# Patient Record
Sex: FEMALE | Race: ASIAN | Employment: UNEMPLOYED | URBAN - METROPOLITAN AREA
[De-identification: names, ages, dates, MRNs, and addresses within clinical notes are randomized per-mention and may not be internally consistent; named-entity substitution may affect disease eponyms.]

---

## 2019-03-12 ENCOUNTER — OFFICE VISIT (OUTPATIENT)
Dept: FAMILY MEDICINE CLINIC | Facility: CLINIC | Age: 34
End: 2019-03-12
Payer: COMMERCIAL

## 2019-03-12 VITALS
HEART RATE: 108 BPM | SYSTOLIC BLOOD PRESSURE: 128 MMHG | DIASTOLIC BLOOD PRESSURE: 76 MMHG | WEIGHT: 200.63 LBS | OXYGEN SATURATION: 98 % | TEMPERATURE: 102 F | RESPIRATION RATE: 16 BRPM | BODY MASS INDEX: 31.12 KG/M2 | HEIGHT: 67.5 IN

## 2019-03-12 DIAGNOSIS — J10.1 INFLUENZA A: Primary | ICD-10-CM

## 2019-03-12 LAB
POCT INFLUENZA A: NEGATIVE
POCT INFLUENZA B: NEGATIVE

## 2019-03-12 PROCEDURE — 87502 INFLUENZA DNA AMP PROBE: CPT | Performed by: PHYSICIAN ASSISTANT

## 2019-03-12 PROCEDURE — 99203 OFFICE O/P NEW LOW 30 MIN: CPT | Performed by: PHYSICIAN ASSISTANT

## 2019-03-12 RX ORDER — OSELTAMIVIR PHOSPHATE 75 MG/1
75 CAPSULE ORAL 2 TIMES DAILY
Qty: 10 CAPSULE | Refills: 0 | Status: SHIPPED | OUTPATIENT
Start: 2019-03-12 | End: 2019-03-17

## 2019-03-12 RX ORDER — DEXTROMETHORPHAN HYDROBROMIDE AND PROMETHAZINE HYDROCHLORIDE 15; 6.25 MG/5ML; MG/5ML
5 SYRUP ORAL 4 TIMES DAILY PRN
Qty: 118 ML | Refills: 0 | Status: SHIPPED | OUTPATIENT
Start: 2019-03-12 | End: 2019-03-22

## 2019-03-13 NOTE — PROGRESS NOTES
CHIEF COMPLAINT:   Patient presents with:  URI: cough,congestion,headache sx x 1 day. m        HPI:   Taylor Askew is a 26year old female who presents for fever. Fever started today. Fever of 102. She took tylenol at 6 pm. She admits to chills/sweats. bulging, no fluid b/l. Nostrils patent, nasal discharge noted. No erythema of the throat. PND noted. No tonsillar enlargement or exudates   NECK: supple, non-tender. LUNGS: Normal respiratory rate. Normal effort. Dry cough. no wheezing.  No rales or cra

## 2019-03-15 ENCOUNTER — OFFICE VISIT (OUTPATIENT)
Dept: FAMILY MEDICINE CLINIC | Facility: CLINIC | Age: 34
End: 2019-03-15
Payer: COMMERCIAL

## 2019-03-15 VITALS
DIASTOLIC BLOOD PRESSURE: 72 MMHG | SYSTOLIC BLOOD PRESSURE: 104 MMHG | WEIGHT: 199.19 LBS | BODY MASS INDEX: 31 KG/M2 | OXYGEN SATURATION: 100 % | HEART RATE: 111 BPM | RESPIRATION RATE: 20 BRPM | TEMPERATURE: 100 F

## 2019-03-15 DIAGNOSIS — J02.0 STREP THROAT: Primary | ICD-10-CM

## 2019-03-15 PROCEDURE — 99213 OFFICE O/P EST LOW 20 MIN: CPT | Performed by: NURSE PRACTITIONER

## 2019-03-15 RX ORDER — AMOXICILLIN 500 MG/1
500 CAPSULE ORAL 2 TIMES DAILY
Qty: 20 CAPSULE | Refills: 0 | Status: SHIPPED | OUTPATIENT
Start: 2019-03-15 | End: 2019-03-25

## 2019-03-15 NOTE — PATIENT INSTRUCTIONS
Pharyngitis: Strep (Confirmed)    You have had a positive test for strep throat. Strep throat is a contagious illness. It is spread by coughing, kissing or by touching others after touching your mouth or nose.  Symptoms include throat pain that is worse w · Lymph nodes getting larger or becoming soft in the middle  · You can't swallow liquids or you can't open your mouth wide because of throat pain  · Signs of dehydration. These include very dark urine or no urine, sunken eyes, and dizziness.   · Trouble irma Take this medicine by mouth. Follow the directions on the package label. Use a specially marked spoon or container to measure each dose. Ask your pharmacist if you do not have one. Household spoons are not accurate. Take your medicine at regular intervals. · other medicines for cold, cough or allergy  · other medicines with acetaminophen  · procarbazine  · Roberta's Wort  What if I miss a dose? If you miss a dose, take it as soon as you can. If it is almost time for your next dose, take only that dose.  Do NOTE:This sheet is a summary. It may not cover all possible information. If you have questions about this medicine, talk to your doctor, pharmacist, or health care provider.  Copyright© 2018 [employer]

## 2019-03-15 NOTE — PROGRESS NOTES
CHIEF COMPLAINT:   Patient presents with:  Headache    HPI:   Daamso Rivera is a 26year old female presents to clinic with symptoms of sore throat. Patient has had for 2 days. Was diagnosed with the flu.  and son have flu and step.  Symptoms hav /72   Pulse 111   Temp 99.9 °F (37.7 °C) (Oral)   Resp 20   Wt 199 lb 3.2 oz   LMP 03/03/2019   SpO2 100%   BMI 30.74 kg/m²   GENERAL: well developed, well nourished,in no apparent distress  SKIN: no rashes,no suspicious lesions  HEAD: atraumatic, no • amoxicillin 500 MG Oral Cap 20 capsule 0     Sig: Take 1 capsule (500 mg total) by mouth 2 (two) times daily for 10 days. Patient Instructions     Pharyngitis: Strep (Confirmed)    You have had a positive test for strep throat.  Strep throat is · Fever of 100.4ºF (38ºC) or higher, or as directed by your healthcare provider  · New or worsening ear pain, sinus pain, or headache  · Painful lumps in the back of neck  · Stiff neck  · Lymph nodes getting larger or becoming soft in the middle  · You can Take this medicine by mouth. Follow the directions on the package label. Use a specially marked spoon or container to measure each dose. Ask your pharmacist if you do not have one. Household spoons are not accurate. Take your medicine at regular intervals. · other medicines for cold, cough or allergy  · other medicines with acetaminophen  · procarbazine  · Roberta's Wort  What if I miss a dose? If you miss a dose, take it as soon as you can. If it is almost time for your next dose, take only that dose.  Do NOTE:This sheet is a summary. It may not cover all possible information. If you have questions about this medicine, talk to your doctor, pharmacist, or health care provider.  Copyright© 2018 [employer]          The patient/parent indicates understanding of th